# Patient Record
Sex: FEMALE | Race: WHITE
[De-identification: names, ages, dates, MRNs, and addresses within clinical notes are randomized per-mention and may not be internally consistent; named-entity substitution may affect disease eponyms.]

---

## 2017-02-15 NOTE — EDDOCDS
Physician Documentation                                                                           

[facility]                                                                         

Name: Shabana Gill                                                                            

Age: 31 yrs                                                                                       

Sex: Female                                                                                       

: 1985                                                                                   

MRN: [id number]                                                                                     

Arrival Date: 02/15/2017                                                                          

Time: 11:20                                                                                       

Account#: [id number]                                                                              

Bed TR7                                                                                           

Private MD: Ty Timmons                                                                           

Disposition:                                                                                      

02/15/17 12:09 Discharged to Home/Self Care. Impression: Acute frontal sinusitis.                 

- Condition is Stable.                                                                            

- Discharge Instructions: Sinus Headache.                                                         

- Prescriptions for Augmentin 875- 125 mg Oral Tablet - take 1 tablet by ORAL route               

every 12 hours for 10 days; 20 tablet. Prednisone 20 mg Oral Tablet - take 1 tablet             

by ORAL route once daily for 5 days; 5 tablet.                                                  

- Medication Reconciliation form.                                                                 

- Follow up: Ty Timmons DO; When: Call to arrange an appointment; Reason: Wound/Symptom           

Recheck, Recheck today's complaints, Continuance of care.                                       

- Problem is an ongoing problem.                                                                  

- Symptoms are unchanged.                                                                         

                                                                                                  

                                                                                                  

                                                                                                  

Historical:                                                                                       

- Allergies: Cipro PO (Unknown); Flagyl (Vomit); SULFA (SULFONAMIDES) (Hives);                    

- Home Meds:                                                                                      

1. Cymbalta 30 mg Oral cpDR 1 cap once daily                                                    

2. Singulair 10 mg Oral tab 1 tab once daily                                                    

3. Motrin 800 mg Oral tab as needed                                                             

4. Topamax Oral daily                                                                           

5. levocetirizine 5 mg oral tab 1 tab once daily                                                

- PMHx: Depression; Endometriosis; IBS; Migraines; Seasonal Allergies;                            

- PSHx: ; acl reconstruction; Tubal ligation; Adenoidectomy;                             

- Social history: Smoking status: Patient states was never smoker of tobacco. No                  

barriers to communication noted, The patient speaks fluent English.                             

- Family history: No immediate family members are acutely ill.                                    

- : The pt / caregiver states he / she is not on anticoagulants. Home medication list             

is obtained from the patient.                                                                   

- Exposure Risk Screening:: None identified.                                                      

                                                                                                  

                                                                                                  

OB/GYN:                                                                                           

02/15                                                                                             

11:29 LMP N/A - Birth control method                                                          ms18

                                                                                                  

Vital Signs:                                                                                      

11:24  / 66 RA Sitting (auto/reg); Pulse 84; Resp 18; Temp 98.0(O); Pulse Ox 98% on     jrd 

      R/A; Weight 85.73 kg / 189 lbs (R); Height 5 ft. 2 in. (157.48 cm) (R); Pain 7/10;          

11:24 Body Mass Index 34.57 (85.73 kg, 157.48 cm)                                             jrd 

                                                                                                  

MDM:                                                                                              

12:03 Financial registration complete.                                                        lg  

12:08 Amoxicillin-Clavulanate 875 mg 1 tabs PO once ordered.                                  mlb1

12:08 predniSONE 20 mg PO once; administer with food or milk ordered.                         mlb1

                                                                                                  

Administered Medications:                                                                         

12:17 Drug: Amoxicillin-Clavulanate 1 tabs [amoxicillin 875 mg-potassium clavulanate 125 mg   mb9 

      tablet (1 tabs)] Route: PO;                                                                 

12:18 Drug: predniSONE 20 mg [prednisone 20 mg tablet (1 tabs)] Route: PO;                    mb9 

                                                                                                  

                                                                                                  

Signatures:                                                                                       

Yrn Lara, Reg                    Reg  lg                                                   

Jose Aguilera RN                   RN   mlb1                                                 

Yamila Amos RN                        RN   ms18                                                 

Jose Dobbs RN                       RN   mb9                                                  

                                                                                                  

**************************************************************************************************

MTDD

## 2017-02-15 NOTE — EDDOCDS
Nurse's Notes                                                                                     

[facility]                                                                         

Name: Shabana Gill                                                                            

Age: 31 yrs                                                                                       

Sex: Female                                                                                       

: 1985                                                                                   

MRN: [id number]                                                                                     

Arrival Date: 02/15/2017                                                                          

Time: 11:20                                                                                       

Account#: [id number]                                                                              

Bed TR7                                                                                           

Private MD: Ty Timmons                                                                           

Diagnosis: Acute frontal sinusitis                                                                

                                                                                                  

Presentation:                                                                                     

02/15                                                                                             

11:26 Presenting complaint: Patient states: that she has been sick "for awhile now" and she   ms18

      thinks she has a sinus infection. This patient has no additional risk factors. Adult        

      Sepsis Screening: The patient does not have new or worsening altered mentation.             

      Patient's respiratory rate is less than 22. Systolic blood pressure is greater than         

      100. Patient has a qSOFA score of 0- Negative Sepsis Screen. Suicide/Homicide risk          

      assessment- the patient denies having any suicidal and/or homicidal ideations and does      

      not present with any other emotional, behavioral or mental health complaints.       

      Status: The patient is a  dependent. Transition of care: patient was not            

      received from another setting of care.                                                      

11:26 Acuity: ROBERT Level 4                                                                     ms18

11:26 Method Of Arrival: Walkin/Carried/Asstd                                                 ms18

                                                                                                  

Triage Assessment:                                                                                

11:29 Headache History: Other not like her migraine headaches, more like a sinus headache.    ms18

      General: Appears in no apparent distress, comfortable, Behavior is appropriate for age,     

      cooperative. Pain: Pain currently is 7 out of 10 on a pain scale. Pain began 2 weeks        

      ago Also complains of no other associated symptoms. HIV screening NA for this visit         

      Offered previously. The patient is triaged at the bedside. See Assessment in Nurses         

      Notes section of ED record. Neurological: No deficits noted. EENT: Reports nasal            

      discharge that is green. Respiratory: No deficits noted. Derm: Skin is pink, warm & dry.  

                                                                                                  

OB/GYN:                                                                                           

11:29 LMP N/A - Birth control method                                                          ms18

                                                                                                  

Historical:                                                                                       

- Allergies: Cipro PO (Unknown); Flagyl (Vomit); SULFA (SULFONAMIDES) (Hives);                    

- Home Meds:                                                                                      

1. Cymbalta 30 mg Oral cpDR 1 cap once daily                                                    

2. Singulair 10 mg Oral tab 1 tab once daily                                                    

3. Motrin 800 mg Oral tab as needed                                                             

4. Topamax Oral daily                                                                           

5. levocetirizine 5 mg oral tab 1 tab once daily                                                

- PMHx: Depression; Endometriosis; IBS; Migraines; Seasonal Allergies;                            

- PSHx: ; acl reconstruction; Tubal ligation; Adenoidectomy;                             

- Social history: Smoking status: Patient states was never smoker of tobacco. No                  

barriers to communication noted, The patient speaks fluent English.                             

- Family history: No immediate family members are acutely ill.                                    

- : The pt / caregiver states he / she is not on anticoagulants. Home medication list             

is obtained from the patient.                                                                   

- Exposure Risk Screening:: None identified.                                                      

                                                                                                  

                                                                                                  

Screenin:18 Screening information is obtained from the patient. Fall risk: No risks identified.     mb9 

      Assistance ADL's: requires no assistance with activities of daily living. Abuse/DV          

      Screen: The patient / caregiver reports he/she is: not in a situation that causes fear,     

      pain or injury. Nutritional screening: No deficits noted. Advance Directives: There is      

      no active DNR order. home support is adequate.                                              

                                                                                                  

Assessment:                                                                                       

12:18 General: Appears in no apparent distress, Behavior is cooperative. Pain: Location:      mb9 

      forehead Pain currently is 4 out of 10 on a pain scale. EENT: Reports nasal congestion.     

      Respiratory: Airway is patent Respiratory effort is even, unlabored.                        

                                                                                                  

Vital Signs:                                                                                      

11:24  / 66 RA Sitting (auto/reg); Pulse 84; Resp 18; Temp 98.0(O); Pulse Ox 98% on     jrd 

      R/A; Weight 85.73 kg (R); Height 5 ft. 2 in. (157.48 cm) (R); Pain 7/10;                    

11:24 Body Mass Index 34.57 (85.73 kg, 157.48 cm)                                             [facility] 

                                                                                                  

Vitals:                                                                                           

11:24 Log In Time: February 15, 2017 at 11:18.                                                [facility] 

                                                                                                  

ED Course:                                                                                        

11:23 Patient visited by Brian Hernandez PCA.                                               jrd 

11:23 Patient moved to Waiting                                                                jrd 

11:24 Ty Timmons DO is Private Physician.                                                     jrd 

11:25 Patient visited by Brian Hernandez PCA.                                               jrd 

11:25 Patient moved to Pre RCE                                                                jrd 

11:27 Triage Initiated                                                                        ms18

11:37 Patient moved to Triage 2                                                               mlb1

11:51 Colin Oliver PA-C is PHCP.                                                           cc10

11:51 Gilberto Espinoza MD is Attending Physician.                                               cc10

12:01 Patient visited by Colin Oliver PA-C.                                                cc10

12:01 Patient visited by Colin Oliver PA-C.                                                cc10

12:09 yT Timmons DO is Referral Physician.                                                    mlb1

12:17 Patient moved to TR7                                                                    mb9 

12:18 The patient / caregiver is instructed regarding the plan of care and ED course.         mb9 

12:18 No IV's were initiated during this patient's visit. No procedures done that require     mb9 

      assistance.                                                                                 

                                                                                                  

Administered Medications:                                                                         

12:17 Drug: Amoxicillin-Clavulanate 1 tabs [amoxicillin 875 mg-potassium clavulanate 125 mg   mb9 

      tablet (1 tabs)] Route: PO;                                                                 

12:18 Drug: predniSONE 20 mg [prednisone 20 mg tablet (1 tabs)] Route: PO;                    mb9 

                                                                                                  

                                                                                                  

Order Results:                                                                                    

There are currently no results for this order.                                                    

Outcome:                                                                                          

12:09 Discharge ordered by Provider.                                                          mlb1

12:18 Discharge Assessment: Patient awake, alert and oriented x 3. No cognitive and/or        mb9 

      functional deficits noted. Patient verbalized understanding of disposition                  

      instructions. patient administered narcotics - no. The following High Risk Discharge        

      criteria are identified: None. Condition: good Condition: stable Condition: improved.       

      Discharge instructions given to patient, Instructed on discharge instructions, follow       

      up and referral plans. medication usage, Demonstrated understanding of instructions,        

      medications, Pt was receptive of discharge instructions/ teaching. Prescriptions given      

      X 2. No special radiology studies were completed. Property :Personal belongings             

      accompany Pt.                                                                               

12:19 Patient left the ED.                                                                    mb9 

                                                                                                  

Signatures:                                                                                       

Jose Aguilera RN                   RN   b1                                                 

Colin Oliver PA-C PA-C cc10                                                 

Yamila Amos RN                        RN   ms18                                                 

Brian Hernandez, PCA                   PCA  Jose Garcia,RN                       RN   mb9                                                  

                                                                                                  

**************************************************************************************************

MTDD

## 2017-02-17 NOTE — EDDOCDS
Physician Documentation                                                                           

[facility]                                                                         

Name: Shabana Gill                                                                            

Age: 31 yrs                                                                                       

Sex: Female                                                                                       

: 1985                                                                                   

MRN: [id number]                                                                                     

Arrival Date: 02/15/2017                                                                          

Time: 11:20                                                                                       

Account#: [id number]                                                                              

Bed TR7                                                                                           

Private MD: Ty Timmons                                                                           

Disposition:                                                                                      

02/15/17 12:09 Discharged to Home/Self Care. Impression: Acute frontal sinusitis.                 

- Condition is Stable.                                                                            

- Discharge Instructions: Sinus Headache.                                                         

- Prescriptions for Augmentin 875- 125 mg Oral Tablet - take 1 tablet by ORAL route               

every 12 hours for 10 days; 20 tablet. Prednisone 20 mg Oral Tablet - take 1 tablet             

by ORAL route once daily for 5 days; 5 tablet.                                                  

- Medication Reconciliation form.                                                                 

- Follow up: Ty Timmons DO; When: Call to arrange an appointment; Reason: Wound/Symptom           

Recheck, Recheck today's complaints, Continuance of care.                                       

- Problem is an ongoing problem.                                                                  

- Symptoms are unchanged.                                                                         

                                                                                                  

                                                                                                  

                                                                                                  

Historical:                                                                                       

- Allergies: Cipro PO (Unknown); Flagyl (Vomit); SULFA (SULFONAMIDES) (Hives);                    

- Home Meds:                                                                                      

1. Cymbalta 30 mg Oral cpDR 1 cap once daily                                                    

2. Singulair 10 mg Oral tab 1 tab once daily                                                    

3. Motrin 800 mg Oral tab as needed                                                             

4. Topamax Oral daily                                                                           

5. levocetirizine 5 mg oral tab 1 tab once daily                                                

- PMHx: Depression; Endometriosis; IBS; Migraines; Seasonal Allergies;                            

- PSHx: ; acl reconstruction; Tubal ligation; Adenoidectomy;                             

- Social history: Smoking status: Patient states was never smoker of tobacco. No                  

barriers to communication noted, The patient speaks fluent English.                             

- Family history: No immediate family members are acutely ill.                                    

- : The pt / caregiver states he / she is not on anticoagulants. Home medication list             

is obtained from the patient.                                                                   

- Exposure Risk Screening:: None identified.                                                      

                                                                                                  

                                                                                                  

OB/GYN:                                                                                           

02/15                                                                                             

11:29 LMP N/A - Birth control method                                                          ms18

                                                                                                  

Vital Signs:                                                                                      

11:24  / 66 RA Sitting (auto/reg); Pulse 84; Resp 18; Temp 98.0(O); Pulse Ox 98% on     jrd 

      R/A; Weight 85.73 kg / 189 lbs (R); Height 5 ft. 2 in. (157.48 cm) (R); Pain 7/10;          

11:24 Body Mass Index 34.57 (85.73 kg, 157.48 cm)                                             jrd 

                                                                                                  

MDM:                                                                                              

12:03 Financial registration complete.                                                        lg  

12:08 Amoxicillin-Clavulanate 875 mg 1 tabs PO once ordered.                                  mlb1

12:08 predniSONE 20 mg PO once; administer with food or milk ordered.                         mlb1

12:28 [facility] Payment Agreement was scanned into [employer] and attached to record.               lg  

14:45 T-Sheet-- Draft Copy was scanned into [employer] and attached to record.                   klr 

                                                                                                  

Administered Medications:                                                                         

12:17 Drug: Amoxicillin-Clavulanate 1 tabs [amoxicillin 875 mg-potassium clavulanate 125 mg   mb9 

      tablet (1 tabs)] Route: PO;                                                                 

12:18 Drug: predniSONE 20 mg [prednisone 20 mg tablet (1 tabs)] Route: PO;                    mb9 

                                                                                                  

                                                                                                  

Signatures:                                                                                       

Yrn Lara, Ash                    Reg  lg                                                   

Jose Aguilera RN                   RN   mlb1                                                 

Yamila Amos RN                        RN   ms18                                                 

Jose Dobbs RN                       RN   mb9                                                  

Leigha Elizabeth                                                  

                                                                                                  

The chart was reviewed and I authenticate all verbal orders and agree with the evaluation and 
treatment provided.Attachments:

12:28 [facility] Payment Agreement                                                                lg  

14:45 T-Sheet-- Draft Copy                                                                    klr 

                                                                                                  

**************************************************************************************************



*** Chart Complete ***
MTDD

## 2017-02-17 NOTE — EDDOCDS
Nurse's Notes                                                                                     

[facility]                                                                         

Name: Shabana Gill                                                                            

Age: 31 yrs                                                                                       

Sex: Female                                                                                       

: 1985                                                                                   

MRN: [id number]                                                                                     

Arrival Date: 02/15/2017                                                                          

Time: 11:20                                                                                       

Account#: [id number]                                                                              

Bed TR7                                                                                           

Private MD: Ty Timmons                                                                           

Diagnosis: Acute frontal sinusitis                                                                

                                                                                                  

Presentation:                                                                                     

02/15                                                                                             

11:26 Presenting complaint: Patient states: that she has been sick "for awhile now" and she   ms18

      thinks she has a sinus infection. This patient has no additional risk factors. Adult        

      Sepsis Screening: The patient does not have new or worsening altered mentation.             

      Patient's respiratory rate is less than 22. Systolic blood pressure is greater than         

      100. Patient has a qSOFA score of 0- Negative Sepsis Screen. Suicide/Homicide risk          

      assessment- the patient denies having any suicidal and/or homicidal ideations and does      

      not present with any other emotional, behavioral or mental health complaints.       

      Status: The patient is a  dependent. Transition of care: patient was not            

      received from another setting of care.                                                      

11:26 Acuity: ROBERT Level 4                                                                     ms18

11:26 Method Of Arrival: Walkin/Carried/Asstd                                                 ms18

                                                                                                  

Triage Assessment:                                                                                

11:29 Headache History: Other not like her migraine headaches, more like a sinus headache.    ms18

      General: Appears in no apparent distress, comfortable, Behavior is appropriate for age,     

      cooperative. Pain: Pain currently is 7 out of 10 on a pain scale. Pain began 2 weeks        

      ago Also complains of no other associated symptoms. HIV screening NA for this visit         

      Offered previously. The patient is triaged at the bedside. See Assessment in Nurses         

      Notes section of ED record. Neurological: No deficits noted. EENT: Reports nasal            

      discharge that is green. Respiratory: No deficits noted. Derm: Skin is pink, warm & dry.  

                                                                                                  

OB/GYN:                                                                                           

11:29 LMP N/A - Birth control method                                                          ms18

                                                                                                  

Historical:                                                                                       

- Allergies: Cipro PO (Unknown); Flagyl (Vomit); SULFA (SULFONAMIDES) (Hives);                    

- Home Meds:                                                                                      

1. Cymbalta 30 mg Oral cpDR 1 cap once daily                                                    

2. Singulair 10 mg Oral tab 1 tab once daily                                                    

3. Motrin 800 mg Oral tab as needed                                                             

4. Topamax Oral daily                                                                           

5. levocetirizine 5 mg oral tab 1 tab once daily                                                

- PMHx: Depression; Endometriosis; IBS; Migraines; Seasonal Allergies;                            

- PSHx: ; acl reconstruction; Tubal ligation; Adenoidectomy;                             

- Social history: Smoking status: Patient states was never smoker of tobacco. No                  

barriers to communication noted, The patient speaks fluent English.                             

- Family history: No immediate family members are acutely ill.                                    

- : The pt / caregiver states he / she is not on anticoagulants. Home medication list             

is obtained from the patient.                                                                   

- Exposure Risk Screening:: None identified.                                                      

                                                                                                  

                                                                                                  

Screenin:18 Screening information is obtained from the patient. Fall risk: No risks identified.     mb9 

      Assistance ADL's: requires no assistance with activities of daily living. Abuse/DV          

      Screen: The patient / caregiver reports he/she is: not in a situation that causes fear,     

      pain or injury. Nutritional screening: No deficits noted. Advance Directives: There is      

      no active DNR order. home support is adequate.                                              

                                                                                                  

Assessment:                                                                                       

12:18 General: Appears in no apparent distress, Behavior is cooperative. Pain: Location:      mb9 

      forehead Pain currently is 4 out of 10 on a pain scale. EENT: Reports nasal congestion.     

      Respiratory: Airway is patent Respiratory effort is even, unlabored.                        

                                                                                                  

Vital Signs:                                                                                      

11:24  / 66 RA Sitting (auto/reg); Pulse 84; Resp 18; Temp 98.0(O); Pulse Ox 98% on     jrd 

      R/A; Weight 85.73 kg (R); Height 5 ft. 2 in. (157.48 cm) (R); Pain 7/10;                    

11:24 Body Mass Index 34.57 (85.73 kg, 157.48 cm)                                             [facility] 

                                                                                                  

Vitals:                                                                                           

11:24 Log In Time: February 15, 2017 at 11:18.                                                [facility] 

                                                                                                  

ED Course:                                                                                        

11:23 Patient visited by Brian Hernandez PCA.                                               jrd 

11:23 Patient moved to Waiting                                                                jrd 

11:24 Ty Timmons DO is Private Physician.                                                     jrd 

11:25 Patient visited by Brian Hernandez PCA.                                               jrd 

11:25 Patient moved to Pre RCE                                                                jrd 

11:27 Triage Initiated                                                                        ms18

11:37 Patient moved to Triage 2                                                               mlb1

11:51 Colin Oliver PA-C is PHCP.                                                           cc10

11:51 Gilberto Espinoza MD is Attending Physician.                                               cc10

12:01 Patient visited by Colin Oliver PA-C.                                                cc10

12:01 Patient visited by Colin Oliver PA-C.                                                cc10

12:09 Ty Timmons DO is Referral Physician.                                                    mlb1

12:17 Patient moved to TR7                                                                    mb9 

12:18 The patient / caregiver is instructed regarding the plan of care and ED course.         mb9 

12:18 No IV's were initiated during this patient's visit. No procedures done that require     mb9 

      assistance.                                                                                 

12:28 [facility] Payment Agreement was scanned into [employer] and attached to record.                 

14:45 T-Sheet-- Draft Copy was scanned into [employer] and attached to record.                   klr 

                                                                                                  

Administered Medications:                                                                         

12:17 Drug: Amoxicillin-Clavulanate 1 tabs [amoxicillin 875 mg-potassium clavulanate 125 mg   mb9 

      tablet (1 tabs)] Route: PO;                                                                 

12:18 Drug: predniSONE 20 mg [prednisone 20 mg tablet (1 tabs)] Route: PO;                    mb9 

                                                                                                  

                                                                                                  

Order Results:                                                                                    

There are currently no results for this order.                                                    

Outcome:                                                                                          

12:09 Discharge ordered by Provider.                                                          mlb1

12:18 Discharge Assessment: Patient awake, alert and oriented x 3. No cognitive and/or        mb9 

      functional deficits noted. Patient verbalized understanding of disposition                  

      instructions. patient administered narcotics - no. The following High Risk Discharge        

      criteria are identified: None. Condition: good Condition: stable Condition: improved.       

      Discharge instructions given to patient, Instructed on discharge instructions, follow       

      up and referral plans. medication usage, Demonstrated understanding of instructions,        

      medications, Pt was receptive of discharge instructions/ teaching. Prescriptions given      

      X 2. No special radiology studies were completed. Property :Personal belongings             

      accompany Pt.                                                                               

12:19 Patient left the ED.                                                                    mb9 

                                                                                                  

Signatures:                                                                                       

Yrn Lara, Ash                    Reg  Jose Jovel RN                   RN   mlb1                                                 

Colin Oliver PA-C PA-C cc10                                                 

Yamila Amos RN                        RN   ms18                                                 

Brian Hernandez, ALMAS                   PCA  Jose Garcia RN                       RN   mb9                                                  

Leigha Elizabeth                               klkevon                                                  

                                                                                                  

**************************************************************************************************



*** Chart Complete ***
MTDD

## 2017-02-17 NOTE — EDDOCDS
Physician Documentation                                                                           

[facility]                                                                         

Name: Shabana Gill                                                                            

Age: 31 yrs                                                                                       

Sex: Female                                                                                       

: 1985                                                                                   

MRN: [id number]                                                                                     

Arrival Date: 02/15/2017                                                                          

Time: 11:20                                                                                       

Account#: [id number]                                                                              

Bed TR7                                                                                           

Private MD: Ty Timmons                                                                           

Disposition:                                                                                      

02/15/17 12:09 Discharged to Home/Self Care. Impression: Acute frontal sinusitis.                 

- Condition is Stable.                                                                            

- Discharge Instructions: Sinus Headache.                                                         

- Prescriptions for Augmentin 875- 125 mg Oral Tablet - take 1 tablet by ORAL route               

every 12 hours for 10 days; 20 tablet. Prednisone 20 mg Oral Tablet - take 1 tablet             

by ORAL route once daily for 5 days; 5 tablet.                                                  

- Medication Reconciliation form.                                                                 

- Follow up: Ty Timmons DO; When: Call to arrange an appointment; Reason: Wound/Symptom           

Recheck, Recheck today's complaints, Continuance of care.                                       

- Problem is an ongoing problem.                                                                  

- Symptoms are unchanged.                                                                         

                                                                                                  

                                                                                                  

                                                                                                  

Historical:                                                                                       

- Allergies: Cipro PO (Unknown); Flagyl (Vomit); SULFA (SULFONAMIDES) (Hives);                    

- Home Meds:                                                                                      

1. Cymbalta 30 mg Oral cpDR 1 cap once daily                                                    

2. Singulair 10 mg Oral tab 1 tab once daily                                                    

3. Motrin 800 mg Oral tab as needed                                                             

4. Topamax Oral daily                                                                           

5. levocetirizine 5 mg oral tab 1 tab once daily                                                

- PMHx: Depression; Endometriosis; IBS; Migraines; Seasonal Allergies;                            

- PSHx: ; acl reconstruction; Tubal ligation; Adenoidectomy;                             

- Social history: Smoking status: Patient states was never smoker of tobacco. No                  

barriers to communication noted, The patient speaks fluent English.                             

- Family history: No immediate family members are acutely ill.                                    

- : The pt / caregiver states he / she is not on anticoagulants. Home medication list             

is obtained from the patient.                                                                   

- Exposure Risk Screening:: None identified.                                                      

                                                                                                  

                                                                                                  

OB/GYN:                                                                                           

02/15                                                                                             

11:29 LMP N/A - Birth control method                                                          [id number]

                                                                                                  

Vital Signs:                                                                                      

11:24  / 66 RA Sitting (auto/reg); Pulse 84; Resp 18; Temp 98.0(O); Pulse Ox 98% on     jrd 

      R/A; Weight 85.73 kg / 189 lbs (R); Height 5 ft. 2 in. (157.48 cm) (R); Pain 7/10;          

11:24 Body Mass Index 34.57 (85.73 kg, 157.48 cm)                                             jrd 

                                                                                                  

MDM:                                                                                              

12:03 Financial registration complete.                                                        lg  

12:08 Amoxicillin-Clavulanate 875 mg 1 tabs PO once ordered.                                  mlb1

12:08 predniSONE 20 mg PO once; administer with food or milk ordered.                         mlb1

12:28 [facility] Payment Agreement was scanned into [facility] and attached to record.               lg  

14:45 T-Sheet-- Draft Copy was scanned into [employer] and attached to record.                   klr 

                                                                                                  

Administered Medications:                                                                         

12:17 Drug: Amoxicillin-Clavulanate 1 tabs [amoxicillin 875 mg-potassium clavulanate 125 mg   mb9 

      tablet (1 tabs)] Route: PO;                                                                 

12:18 Drug: predniSONE 20 mg [prednisone 20 mg tablet (1 tabs)] Route: PO;                    mb9 

                                                                                                  

                                                                                                  

Signatures:                                                                                       

Yrn Lara, Ash                    Reg  lg                                                   

Jose Aguilera RN                   RN   mlb1                                                 

Yamila Amos RN                        RN   ms18                                                 

Jose Dobbs RN                       RN   mb9                                                  

Leigha Elizabeth                                                  

                                                                                                  

The chart was reviewed and I authenticate all verbal orders and agree with the evaluation and 
treatment provided.Attachments:

12:28 [facility] Payment Agreement                                                                lg  

14:45 T-Sheet-- Draft Copy                                                                    klr 

                                                                                                  

**************************************************************************************************



*** Chart Complete ***
MTDD

## 2017-05-27 NOTE — REP
Clinical:  Trauma.

 

Technique:  AP, lateral, bilateral oblique views of the left ankle.

 

Findings:

There is a nondisplaced fracture of the lateral malleolus/fibular metaphysis with

overlying soft tissue swelling.  No other fracture dislocation is appreciated.

 

Impression:

Nondisplaced fracture of the distal fibular metaphysis with overlying soft tissue

swelling.

 

 

Signed by

Jaun Villafuerte MD 05/27/2017 07:25 P

## 2017-05-27 NOTE — REP
Clinical:  Trauma.

 

Technique:  AP, lateral, bilateral oblique views left foot .

 

Findings:  The osseous structures and joint spaces are intact and normal.  There

is no evidence for acute fracture or dislocation.  Surrounding soft tissues are

unremarkable.  No subcutaneous emphysema or radiodense foreign body.

 

Impression:

No acute fracture or dislocation of the foot.

Nondisplaced fracture of the distal fibular metaphysis.

 

 

Signed by

Jaun Villafuerte MD 05/27/2017 07:27 P

## 2017-07-10 ENCOUNTER — HOSPITAL ENCOUNTER (OUTPATIENT)
Dept: HOSPITAL 53 - M SFHCCLAY | Age: 32
End: 2017-07-10
Attending: FAMILY MEDICINE
Payer: OTHER GOVERNMENT

## 2017-07-10 DIAGNOSIS — R87.610: ICD-10-CM

## 2017-07-10 DIAGNOSIS — Z12.4: Primary | ICD-10-CM

## 2017-10-19 ENCOUNTER — HOSPITAL ENCOUNTER (OUTPATIENT)
Dept: HOSPITAL 53 - M LAB REF | Age: 32
End: 2017-10-19
Attending: OBSTETRICS & GYNECOLOGY
Payer: OTHER GOVERNMENT

## 2017-10-19 DIAGNOSIS — N92.1: Primary | ICD-10-CM

## 2017-10-19 LAB
ERYTHROCYTE [DISTWIDTH] IN BLOOD BY AUTOMATED COUNT: 12.4 % (ref 11.5–14.5)
FSH SERPL-ACNC: 3.4 MIU/ML
LH SERPL-ACNC: 7.5 MIU/ML
MCH RBC QN AUTO: 30.9 PG (ref 27–33)
MCHC RBC AUTO-ENTMCNC: 34.4 G/DL (ref 32–36.5)
MCV RBC AUTO: 89.9 FL (ref 80–96)
NRBC BLD AUTO-RTO: 0 % (ref 0–0)
PLATELET # BLD AUTO: 430 10^3/UL (ref 150–450)
T4 FREE SERPL-MCNC: 0.94 NG/DL (ref 0.76–1.46)
WBC # BLD AUTO: 8.8 10^3/UL (ref 4–10)

## 2017-12-01 ENCOUNTER — HOSPITAL ENCOUNTER (EMERGENCY)
Dept: HOSPITAL 53 - M ED | Age: 32
Discharge: LEFT BEFORE DISCHARGE | End: 2017-12-01
Payer: OTHER GOVERNMENT

## 2017-12-01 VITALS — BODY MASS INDEX: 53.92 KG/M2 | WEIGHT: 293 LBS | HEIGHT: 62 IN

## 2017-12-01 VITALS — DIASTOLIC BLOOD PRESSURE: 80 MMHG | SYSTOLIC BLOOD PRESSURE: 116 MMHG

## 2017-12-01 DIAGNOSIS — Z53.29: Primary | ICD-10-CM

## 2018-01-04 ENCOUNTER — HOSPITAL ENCOUNTER (EMERGENCY)
Dept: HOSPITAL 53 - M ED | Age: 33
Discharge: HOME | End: 2018-01-04
Payer: COMMERCIAL

## 2018-01-04 DIAGNOSIS — H65.03: Primary | ICD-10-CM

## 2018-01-04 DIAGNOSIS — Z88.8: ICD-10-CM

## 2018-01-04 DIAGNOSIS — Z79.3: ICD-10-CM

## 2018-01-04 DIAGNOSIS — J02.9: ICD-10-CM

## 2018-01-04 DIAGNOSIS — Z88.1: ICD-10-CM

## 2018-01-04 DIAGNOSIS — Z91.048: ICD-10-CM

## 2018-01-04 DIAGNOSIS — N80.9: ICD-10-CM

## 2018-01-04 DIAGNOSIS — M54.5: ICD-10-CM

## 2018-01-04 DIAGNOSIS — Z79.899: ICD-10-CM

## 2018-01-04 DIAGNOSIS — Z79.82: ICD-10-CM

## 2018-01-04 DIAGNOSIS — Z88.2: ICD-10-CM

## 2018-01-04 LAB
BASO #: 0 10^3/UL (ref 0–0.2)
BASO %: 0.4 % (ref 0–1)
CONTROL LINE MONO: (no result)
EOS #: 0 10^3/UL (ref 0–0.5)
EOSINOPHIL NFR BLD AUTO: 0.4 % (ref 0–3)
HEMATOCRIT: 39.7 % (ref 36–47)
HEMOGLOBIN: 13.8 G/DL (ref 12–16)
IMMATURE GRANULOCYTE #: 0 10^3/UL (ref 0–0)
IMMATURE GRANULOCYTE %: 0.3 % (ref 0–0)
LYMPH #: 1.2 10^3/UL (ref 1.5–4.5)
LYMPH %: 10.6 % (ref 24–44)
MEAN CORPUSCULAR HEMOGLOBIN: 31.2 PG (ref 27–33)
MEAN CORPUSCULAR HGB CONC: 34.8 G/DL (ref 32–36.5)
MEAN CORPUSCULAR VOLUME: 89.6 FL (ref 80–96)
MONO #: 0.6 10^3/UL (ref 0–0.8)
MONO %: 5.9 % (ref 0–5)
MONO SCRN: NEGATIVE
NEUTROPHILS #: 9 10^3/UL (ref 1.8–7.7)
NEUTROPHILS %: 82.4 % (ref 36–66)
NRBC BLD AUTO-RTO: 0 % (ref 0–0)
PLATELET COUNT, AUTOMATED: 300 10^3/UL (ref 150–450)
RED BLOOD COUNT: 4.43 10^6/UL (ref 4–5.4)
RED CELL DISTRIBUTION WIDTH: 12.2 % (ref 11.5–14.5)
WHITE BLOOD COUNT: 10.9 10^3/UL (ref 4–10)

## 2018-01-04 RX ADMIN — CEFTRIAXONE 1 GM: 1 INJECTION, POWDER, FOR SOLUTION INTRAMUSCULAR; INTRAVENOUS at 21:30

## 2018-01-04 RX ADMIN — ACETAMINOPHEN 1 MG: 325 TABLET ORAL at 20:30

## 2018-02-25 ENCOUNTER — HOSPITAL ENCOUNTER (EMERGENCY)
Dept: HOSPITAL 53 - M ED | Age: 33
Discharge: HOME | End: 2018-02-25
Payer: COMMERCIAL

## 2018-02-25 DIAGNOSIS — Z88.8: ICD-10-CM

## 2018-02-25 DIAGNOSIS — K58.9: ICD-10-CM

## 2018-02-25 DIAGNOSIS — Z87.891: ICD-10-CM

## 2018-02-25 DIAGNOSIS — K64.5: Primary | ICD-10-CM

## 2018-02-25 DIAGNOSIS — Z79.82: ICD-10-CM

## 2018-02-25 DIAGNOSIS — Z91.048: ICD-10-CM

## 2018-02-25 DIAGNOSIS — F33.9: ICD-10-CM

## 2018-02-25 DIAGNOSIS — K90.41: ICD-10-CM

## 2018-02-25 DIAGNOSIS — F41.9: ICD-10-CM

## 2018-02-25 DIAGNOSIS — Z88.2: ICD-10-CM

## 2018-02-25 DIAGNOSIS — Z79.3: ICD-10-CM

## 2018-02-25 DIAGNOSIS — Z79.899: ICD-10-CM

## 2018-02-25 PROCEDURE — 99283 EMERGENCY DEPT VISIT LOW MDM: CPT

## 2018-03-05 ENCOUNTER — HOSPITAL ENCOUNTER (EMERGENCY)
Dept: HOSPITAL 53 - M ED | Age: 33
LOS: 1 days | Discharge: HOME | End: 2018-03-06
Payer: COMMERCIAL

## 2018-03-05 DIAGNOSIS — Z91.018: ICD-10-CM

## 2018-03-05 DIAGNOSIS — Z79.899: ICD-10-CM

## 2018-03-05 DIAGNOSIS — F32.9: ICD-10-CM

## 2018-03-05 DIAGNOSIS — Z88.1: ICD-10-CM

## 2018-03-05 DIAGNOSIS — K58.9: ICD-10-CM

## 2018-03-05 DIAGNOSIS — F41.9: ICD-10-CM

## 2018-03-05 DIAGNOSIS — G43.909: ICD-10-CM

## 2018-03-05 DIAGNOSIS — M54.9: ICD-10-CM

## 2018-03-05 DIAGNOSIS — Z88.2: ICD-10-CM

## 2018-03-05 DIAGNOSIS — Z91.048: ICD-10-CM

## 2018-03-05 DIAGNOSIS — N30.01: Primary | ICD-10-CM

## 2018-03-05 DIAGNOSIS — Z88.8: ICD-10-CM

## 2018-03-05 DIAGNOSIS — Z87.442: ICD-10-CM

## 2018-03-05 LAB
BASO #: 0.1 10^3/UL (ref 0–0.2)
BASO %: 1.1 % (ref 0–1)
EOS #: 0.1 10^3/UL (ref 0–0.5)
EOSINOPHIL NFR BLD AUTO: 1.2 % (ref 0–3)
HEMATOCRIT: 39 % (ref 36–47)
HEMOGLOBIN: 13.3 G/DL (ref 12–16)
IMMATURE GRANULOCYTE %: 0.4 % (ref 0–3)
LEUKOCYTE ESTERASE UR AUTO RFX: NEGATIVE
LYMPH #: 2.8 10^3/UL (ref 1.5–4.5)
LYMPH %: 33.3 % (ref 24–44)
MEAN CORPUSCULAR HEMOGLOBIN: 30.8 PG (ref 27–33)
MEAN CORPUSCULAR HGB CONC: 34.1 G/DL (ref 32–36.5)
MEAN CORPUSCULAR VOLUME: 90.3 FL (ref 80–96)
MONO #: 0.7 10^3/UL (ref 0–0.8)
MONO %: 7.9 % (ref 0–5)
NEUTROPHILS #: 4.8 10^3/UL (ref 1.8–7.7)
NEUTROPHILS %: 56.1 % (ref 36–66)
NRBC BLD AUTO-RTO: 0 % (ref 0–0)
PLATELET COUNT, AUTOMATED: 385 10^3/UL (ref 150–450)
RED BLOOD COUNT: 4.32 10^6/UL (ref 4–5.4)
RED CELL DISTRIBUTION WIDTH: 12.7 % (ref 11.5–14.5)
SPECIFIC GRAVITY UR AUTO RFX: 1.01 (ref 1–1.03)
SQUAM EPITHELIAL CELL UR AURFX: 0 /HPF (ref 0–6)
WHITE BLOOD COUNT: 8.5 10^3/UL (ref 4–10)

## 2018-03-05 RX ADMIN — KETOROLAC TROMETHAMINE 1 MG: 30 INJECTION, SOLUTION INTRAMUSCULAR at 23:15

## 2018-03-05 RX ADMIN — SODIUM CHLORIDE 1 MLS/HR: 9 INJECTION, SOLUTION INTRAVENOUS at 23:15

## 2018-03-06 LAB
ALBUMIN/GLOBULIN RATIO: 1.08 (ref 1–1.93)
ALBUMIN: 3.9 GM/DL (ref 3.2–5.2)
ALKALINE PHOSPHATASE: 77 U/L (ref 45–117)
ALT SERPL W P-5'-P-CCNC: 14 U/L (ref 12–78)
ANION GAP: 6 MEQ/L (ref 8–16)
AST SERPL-CCNC: 10 U/L (ref 7–37)
BILIRUB CONJ SERPL-MCNC: 0.1 MG/DL (ref 0–0.2)
BILIRUBIN,TOTAL: 0.5 MG/DL (ref 0.2–1)
BLOOD UREA NITROGEN: 11 MG/DL (ref 7–18)
CALCIUM LEVEL: 9.6 MG/DL (ref 8.5–10.1)
CARBON DIOXIDE LEVEL: 29 MEQ/L (ref 21–32)
CHLORIDE LEVEL: 107 MEQ/L (ref 98–107)
CONTROL LINE UCG: (no result)
CREATININE FOR GFR: 0.74 MG/DL (ref 0.55–1.3)
GFR SERPL CREATININE-BSD FRML MDRD: > 60 ML/MIN/{1.73_M2} (ref 60–?)
GLUCOSE, FASTING: 78 MG/DL (ref 70–100)
POTASSIUM SERUM: 3.9 MEQ/L (ref 3.5–5.1)
SODIUM LEVEL: 142 MEQ/L (ref 136–145)
TOTAL PROTEIN: 7.5 GM/DL (ref 6.4–8.2)
URINE PREG TEST: NEGATIVE

## 2018-03-06 RX ADMIN — CIPROFLOXACIN HYDROCHLORIDE 1 MG: 500 TABLET, FILM COATED ORAL at 02:21

## 2018-03-15 ENCOUNTER — HOSPITAL ENCOUNTER (OUTPATIENT)
Dept: HOSPITAL 53 - M SMT | Age: 33
End: 2018-03-15
Attending: NURSE PRACTITIONER
Payer: COMMERCIAL

## 2018-03-15 DIAGNOSIS — R31.9: Primary | ICD-10-CM

## 2018-03-15 LAB
APPEARANCE, URINE: CLEAR
BACTERIA UR QL AUTO: (no result)
BILIRUBIN, URINE AUTO: NEGATIVE
BLOOD, URINE BLOOD: (no result)
GLUCOSE, URINE (UA) AUTO: NEGATIVE MG/DL
KETONE, URINE AUTO: NEGATIVE MG/DL
LEUKOCYTE ESTERASE UR QL STRIP.AUTO: NEGATIVE
MUCUS, URINE: (no result)
NITRITE, URINE AUTO: NEGATIVE
PH,URINE: 5 UNITS (ref 5–9)
PROT UR QL STRIP.AUTO: NEGATIVE MG/DL
RBC, URINE AUTO: 2 /HPF (ref 0–3)
SPECIFIC GRAVITY URINE AUTO: 1.02 (ref 1–1.03)
SQUAMOUS #/AREA URNS AUTO: 0 /HPF (ref 0–6)
UROBILINOGEN, URINE AUTO: 0.2 MG/DL (ref 0–2)
WBC, URINE AUTO: 1 /HPF (ref 0–3)

## 2018-03-16 ENCOUNTER — HOSPITAL ENCOUNTER (OUTPATIENT)
Dept: HOSPITAL 53 - M SMT | Age: 33
End: 2018-03-16
Attending: NURSE PRACTITIONER
Payer: COMMERCIAL

## 2018-03-16 DIAGNOSIS — R31.9: Primary | ICD-10-CM

## 2018-03-16 PROCEDURE — 87086 URINE CULTURE/COLONY COUNT: CPT

## 2018-03-30 ENCOUNTER — HOSPITAL ENCOUNTER (OUTPATIENT)
Dept: HOSPITAL 53 - M LAB | Age: 33
End: 2018-03-30
Attending: NURSE PRACTITIONER
Payer: COMMERCIAL

## 2018-03-30 DIAGNOSIS — R53.83: Primary | ICD-10-CM

## 2018-03-30 LAB
25(OH)D3 SERPL-MCNC: 29.6 NG/ML (ref 30–100)
ALBUMIN/GLOBULIN RATIO: 0.95 (ref 1–1.93)
ALBUMIN: 3.9 GM/DL (ref 3.2–5.2)
ALKALINE PHOSPHATASE: 74 U/L (ref 45–117)
ALT SERPL W P-5'-P-CCNC: 17 U/L (ref 12–78)
ANION GAP: 6 MEQ/L (ref 8–16)
AST SERPL-CCNC: 12 U/L (ref 7–37)
BASO #: 0.1 10^3/UL (ref 0–0.2)
BASO %: 1.1 % (ref 0–1)
BILIRUBIN,TOTAL: 0.5 MG/DL (ref 0.2–1)
BLOOD UREA NITROGEN: 12 MG/DL (ref 7–18)
CALCIUM LEVEL: 9 MG/DL (ref 8.5–10.1)
CARBON DIOXIDE LEVEL: 24 MEQ/L (ref 21–32)
CHLORIDE LEVEL: 111 MEQ/L (ref 98–107)
CHOLEST SERPL-MCNC: 189 MG/DL (ref ?–200)
CHOLESTEROL RISK RATIO: 3 (ref ?–5)
CREATININE FOR GFR: 0.88 MG/DL (ref 0.55–1.3)
EOS #: 0.1 10^3/UL (ref 0–0.5)
EOSINOPHIL NFR BLD AUTO: 1.4 % (ref 0–3)
FOLATE SERPL-MCNC: 14.6 NG/ML (ref 5.4–?)
FREE T4: 1.22 NG/DL (ref 0.76–1.46)
GFR SERPL CREATININE-BSD FRML MDRD: > 60 ML/MIN/{1.73_M2} (ref 60–?)
GLUCOSE, FASTING: 76 MG/DL (ref 70–100)
HDLC SERPL-MCNC: 63 MG/DL (ref 40–?)
HEMATOCRIT: 42.4 % (ref 36–47)
HEMOGLOBIN: 14.9 G/DL (ref 12–15.5)
IMMATURE GRANULOCYTE %: 0.2 % (ref 0–3)
LDL CHOLESTEROL: 112.2 MG/DL (ref ?–100)
LYMPH #: 2.4 10^3/UL (ref 1.5–4.5)
LYMPH %: 37.2 % (ref 24–44)
MEAN CORPUSCULAR HEMOGLOBIN: 31.2 PG (ref 27–33)
MEAN CORPUSCULAR HGB CONC: 35.1 G/DL (ref 32–36.5)
MEAN CORPUSCULAR VOLUME: 88.9 FL (ref 80–96)
MONO #: 0.5 10^3/UL (ref 0–0.8)
MONO %: 7.4 % (ref 0–5)
NEUTROPHILS #: 3.3 10^3/UL (ref 1.8–7.7)
NEUTROPHILS %: 52.7 % (ref 36–66)
NONHDLC SERPL-MCNC: 126 MG/DL
NRBC BLD AUTO-RTO: 0 % (ref 0–0)
PLATELET COUNT, AUTOMATED: 410 10^3/UL (ref 150–450)
POTASSIUM SERUM: 4 MEQ/L (ref 3.5–5.1)
PTH-INTACT SERPL-MCNC: 44.8 PG/ML (ref 18.5–88)
RED BLOOD COUNT: 4.77 10^6/UL (ref 4–5.4)
RED CELL DISTRIBUTION WIDTH: 12.6 % (ref 11.5–14.5)
SODIUM LEVEL: 141 MEQ/L (ref 136–145)
THYROID STIMULATING HORMONE: 0.82 UIU/ML (ref 0.36–3.74)
TOTAL PROTEIN: 8 GM/DL (ref 6.4–8.2)
TRIGLYCERIDES LEVEL: 69 MG/DL (ref ?–150)
VITAMIN B12 LEVEL: 496 PG/ML (ref 247–911)
WHITE BLOOD COUNT: 6.3 10^3/UL (ref 4–10)

## 2018-03-30 PROCEDURE — 82746 ASSAY OF FOLIC ACID SERUM: CPT

## 2018-06-11 ENCOUNTER — HOSPITAL ENCOUNTER (OUTPATIENT)
Dept: HOSPITAL 53 - M SFHCPLAZ | Age: 33
End: 2018-06-11
Attending: NURSE PRACTITIONER
Payer: COMMERCIAL

## 2018-06-11 DIAGNOSIS — Z11.3: ICD-10-CM

## 2018-06-11 DIAGNOSIS — Z12.4: Primary | ICD-10-CM

## 2018-06-11 LAB
CHLAMYDIA DNA AMPLIFICATION: NEGATIVE
GC DNA AMPLIFICATION: NEGATIVE

## 2018-06-12 LAB — HPV LOW VOL RFLX: (no result)
